# Patient Record
Sex: MALE | Race: WHITE | NOT HISPANIC OR LATINO | Employment: FULL TIME | URBAN - NONMETROPOLITAN AREA
[De-identification: names, ages, dates, MRNs, and addresses within clinical notes are randomized per-mention and may not be internally consistent; named-entity substitution may affect disease eponyms.]

---
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
## 2022-02-02 ENCOUNTER — OFFICE VISIT (OUTPATIENT)
Dept: FAMILY MEDICINE CLINIC | Facility: CLINIC | Age: 22
End: 2022-02-02

## 2022-02-02 VITALS
DIASTOLIC BLOOD PRESSURE: 78 MMHG | OXYGEN SATURATION: 98 % | TEMPERATURE: 97.4 F | HEART RATE: 85 BPM | BODY MASS INDEX: 39.17 KG/M2 | WEIGHT: 315 LBS | HEIGHT: 75 IN | SYSTOLIC BLOOD PRESSURE: 132 MMHG

## 2022-02-02 DIAGNOSIS — Z86.16 HISTORY OF COVID-19: ICD-10-CM

## 2022-02-02 DIAGNOSIS — D68.9 BLOOD CLOTTING DISORDER: Chronic | ICD-10-CM

## 2022-02-02 DIAGNOSIS — I10 HYPERTENSION, UNSPECIFIED TYPE: Primary | Chronic | ICD-10-CM

## 2022-02-02 DIAGNOSIS — E66.01 CLASS 3 SEVERE OBESITY DUE TO EXCESS CALORIES WITH BODY MASS INDEX (BMI) OF 50.0 TO 59.9 IN ADULT, UNSPECIFIED WHETHER SERIOUS COMORBIDITY PRESENT: ICD-10-CM

## 2022-02-02 LAB
FLUAV RNA RESP QL NAA+PROBE: NOT DETECTED
FLUBV RNA RESP QL NAA+PROBE: NOT DETECTED
SARS-COV-2 RNA RESP QL NAA+PROBE: NOT DETECTED

## 2022-02-02 PROCEDURE — 99214 OFFICE O/P EST MOD 30 MIN: CPT | Performed by: NURSE PRACTITIONER

## 2022-02-02 PROCEDURE — 87636 SARSCOV2 & INF A&B AMP PRB: CPT | Performed by: NURSE PRACTITIONER

## 2022-02-02 RX ORDER — CARVEDILOL 3.12 MG/1
3.12 TABLET ORAL 2 TIMES DAILY WITH MEALS
Qty: 60 TABLET | Refills: 5 | Status: SHIPPED | OUTPATIENT
Start: 2022-02-02 | End: 2022-04-03

## 2022-02-02 NOTE — PROGRESS NOTES
Chief Complaint  Establish Care    Subjective          Juvenal Anderson is a 21 y.o. male who presents today to [facility] for establishing care.    HPI:   Patient presents today to [facility].  He states that he is from [place] next year now is a couple months ago.  Patient states he has a history of hypertension and tachycardia which is why he is on Coreg.  He states he also has history of blood clots.  He has had DVT to the left lower extremity twice and has history of a pulmonary embolism.  He denies any current chest pain or swelling.  He states blood clots run in his family as his mother suffers from a clotting disorder as well as her grandmother.  He has no signs or symptoms of bleeding or bruising.  He states he tolerates Eliquis well.    Patient would like to have a Covid test today.  He states his employer is requiring it before he returns to work tomorrow.  Currently asymptomatic, states he had Covid about 3 months ago.        Objective     Problem List:  Patient Active Problem List   Diagnosis   • Attention deficit hyperactivity disorder (ADHD), combined type   • MDD (major depressive disorder)   • Pulmonary emboli (HCC)   • Morbid obesity (HCC)   • Deep vein thrombosis (DVT) of popliteal vein of left lower extremity (HCC)   • Medically noncompliant   • Tobacco use       Allergy:   No Known Allergies     Discontinued Medications:  Medications Discontinued During This Encounter   Medication Reason   • apixaban (ELIQUIS) 5 MG tablet tablet Duplicate order   • carvedilol (COREG) 3.125 MG tablet Reorder   • apixaban (ELIQUIS) 5 MG tablet tablet Reorder       Current Medications:   Current Outpatient Medications   Medication Sig Dispense Refill   • apixaban (ELIQUIS) 5 MG tablet tablet Take 1 tablet by mouth Every 12 (Twelve) Hours. 60 tablet 5   • carvedilol (COREG) 3.125 MG tablet Take 1 tablet by mouth 2 (Two) Times a Day With Meals for 60 days. 60 tablet 5     No current  "facility-administered medications for this visit.       Past Medical History:  Past Medical History:   Diagnosis Date   • ADHD    • Anxiety    • MDD (major depressive disorder)    • Medically noncompliant 6/30/2021   • Morbid obesity (HCC) 10/22/2020   • Oppositional defiant disorder    • Tobacco use 6/30/2021       Past Surgical History:  History reviewed. No pertinent surgical history.    Review of Systems:  Review of Systems   Constitutional: Negative.    HENT: Negative.    Eyes: Negative.    Respiratory: Negative.    Cardiovascular: Negative.    Gastrointestinal: Negative.    Genitourinary: Negative.    Musculoskeletal: Negative.    Skin: Negative.    Neurological: Negative.    Psychiatric/Behavioral: Negative.    All other systems reviewed and are negative.      Physical Exam:  Physical Exam  Vitals and nursing note reviewed.   Constitutional:       General: He is not in acute distress.     Appearance: Normal appearance. He is obese. He is not ill-appearing.      Interventions: Face mask in place.   HENT:      Head: Normocephalic and atraumatic.      Nose: Nose normal.   Cardiovascular:      Rate and Rhythm: Normal rate and regular rhythm.   Pulmonary:      Effort: Pulmonary effort is normal. No respiratory distress.      Breath sounds: Normal breath sounds.   Abdominal:      Palpations: Abdomen is soft.   Musculoskeletal:         General: Normal range of motion.      Cervical back: Normal range of motion and neck supple.   Skin:     General: Skin is warm and dry.   Neurological:      Mental Status: He is alert and oriented to person, place, and time.   Psychiatric:         Mood and Affect: Mood normal.         Behavior: Behavior normal.         Vital Signs:   /78 (BP Location: Right arm, Patient Position: Sitting)   Pulse 85   Temp 97.4 °F (36.3 °C)   Ht 190.5 cm (75\")   Wt (!) 183 kg (403 lb)   SpO2 98%   BMI 50.37 kg/m²      Lab Results:   No visits with results within 6 Month(s) from this visit. "   Latest known visit with results is:   Admission on 06/30/2021, Discharged on 07/01/2021   Component Date Value Ref Range Status   • Glucose 06/30/2021 106* 65 - 99 mg/dL Final   • BUN 06/30/2021 12  6 - 20 mg/dL Final   • Creatinine 06/30/2021 0.78  0.76 - 1.27 mg/dL Final   • Sodium 06/30/2021 136  136 - 145 mmol/L Final   • Potassium 06/30/2021 4.0  3.5 - 5.2 mmol/L Final    Slight hemolysis detected by analyzer. Results may be affected.   • Chloride 06/30/2021 101  98 - 107 mmol/L Final   • CO2 06/30/2021 28.4  22.0 - 29.0 mmol/L Final   • Calcium 06/30/2021 9.3  8.6 - 10.5 mg/dL Final   • Total Protein 06/30/2021 7.0  6.0 - 8.5 g/dL Final   • Albumin 06/30/2021 3.66  3.50 - 5.20 g/dL Final   • ALT (SGPT) 06/30/2021 27  1 - 41 U/L Final   • AST (SGOT) 06/30/2021 21  1 - 40 U/L Final   • Alkaline Phosphatase 06/30/2021 84  56 - 127 U/L Final   • Total Bilirubin 06/30/2021 0.2  0.0 - 1.2 mg/dL Final   • eGFR Non African Amer 06/30/2021 130  >60 mL/min/1.73 Final   • Globulin 06/30/2021 3.3  gm/dL Final   • A/G Ratio 06/30/2021 1.1  g/dL Final   • BUN/Creatinine Ratio 06/30/2021 15.4  7.0 - 25.0 Final   • Anion Gap 06/30/2021 6.6  5.0 - 15.0 mmol/L Final   • COVID19 06/30/2021 Not Detected  Not Detected - Ref. Range Final   • Influenza A PCR 06/30/2021 Not Detected  Not Detected Final   • Influenza B PCR 06/30/2021 Not Detected  Not Detected Final   • Protime 06/30/2021 12.9  12.8 - 14.5 Seconds Final   • INR 06/30/2021 0.94  0.90 - 1.10 Final   • PTT 06/30/2021 29.9  25.5 - 35.4 seconds Final   • WBC 06/30/2021 11.56* 3.40 - 10.80 10*3/mm3 Final   • RBC 06/30/2021 5.27  4.14 - 5.80 10*6/mm3 Final   • Hemoglobin 06/30/2021 14.2  13.0 - 17.7 g/dL Final   • Hematocrit 06/30/2021 44.0  37.5 - 51.0 % Final   • MCV 06/30/2021 83.5  79.0 - 97.0 fL Final   • MCH 06/30/2021 26.9  26.6 - 33.0 pg Final   • MCHC 06/30/2021 32.3  31.5 - 35.7 g/dL Final   • RDW 06/30/2021 13.9  12.3 - 15.4 % Final   • RDW-SD 06/30/2021 42.6   37.0 - 54.0 fl Final   • MPV 06/30/2021 9.8  6.0 - 12.0 fL Final   • Platelets 06/30/2021 287  140 - 450 10*3/mm3 Final   • Neutrophil % 06/30/2021 58.1  42.7 - 76.0 % Final   • Lymphocyte % 06/30/2021 28.9  19.6 - 45.3 % Final   • Monocyte % 06/30/2021 8.6  5.0 - 12.0 % Final   • Eosinophil % 06/30/2021 3.5  0.3 - 6.2 % Final   • Basophil % 06/30/2021 0.6  0.0 - 1.5 % Final   • Immature Grans % 06/30/2021 0.3  0.0 - 0.5 % Final   • Neutrophils, Absolute 06/30/2021 6.72  1.70 - 7.00 10*3/mm3 Final   • Lymphocytes, Absolute 06/30/2021 3.34* 0.70 - 3.10 10*3/mm3 Final   • Monocytes, Absolute 06/30/2021 0.99* 0.10 - 0.90 10*3/mm3 Final   • Eosinophils, Absolute 06/30/2021 0.41* 0.00 - 0.40 10*3/mm3 Final   • Basophils, Absolute 06/30/2021 0.07  0.00 - 0.20 10*3/mm3 Final   • Immature Grans, Absolute 06/30/2021 0.03  0.00 - 0.05 10*3/mm3 Final   • nRBC 06/30/2021 0.0  0.0 - 0.2 /100 WBC Final   • QT Interval 07/01/2021 350  ms Final   • QTC Interval 07/01/2021 437  ms Final   • Hemoglobin A1C 06/30/2021 5.70* 4.80 - 5.60 % Final   • TSH 06/30/2021 1.420  0.270 - 4.200 uIU/mL Final   • Glucose 07/01/2021 109* 65 - 99 mg/dL Final   • BUN 07/01/2021 11  6 - 20 mg/dL Final   • Creatinine 07/01/2021 0.72* 0.76 - 1.27 mg/dL Final   • Sodium 07/01/2021 135* 136 - 145 mmol/L Final   • Potassium 07/01/2021 3.8  3.5 - 5.2 mmol/L Final   • Chloride 07/01/2021 101  98 - 107 mmol/L Final   • CO2 07/01/2021 25.9  22.0 - 29.0 mmol/L Final   • Calcium 07/01/2021 9.1  8.6 - 10.5 mg/dL Final   • Total Protein 07/01/2021 6.9  6.0 - 8.5 g/dL Final   • Albumin 07/01/2021 3.57  3.50 - 5.20 g/dL Final   • ALT (SGPT) 07/01/2021 25  1 - 41 U/L Final   • AST (SGOT) 07/01/2021 19  1 - 40 U/L Final   • Alkaline Phosphatase 07/01/2021 80  56 - 127 U/L Final   • Total Bilirubin 07/01/2021 0.2  0.0 - 1.2 mg/dL Final   • eGFR Non African Amer 07/01/2021 142  >60 mL/min/1.73 Final   • Globulin 07/01/2021 3.3  gm/dL Final   • A/G Ratio 07/01/2021 1.1   g/dL Final   • BUN/Creatinine Ratio 07/01/2021 15.3  7.0 - 25.0 Final   • Anion Gap 07/01/2021 8.1  5.0 - 15.0 mmol/L Final   • Magnesium 07/01/2021 2.0  1.7 - 2.2 mg/dL Final   • Phosphorus 07/01/2021 4.2  2.5 - 4.5 mg/dL Final   • WBC 07/01/2021 11.77* 3.40 - 10.80 10*3/mm3 Final   • RBC 07/01/2021 5.10  4.14 - 5.80 10*6/mm3 Final   • Hemoglobin 07/01/2021 13.8  13.0 - 17.7 g/dL Final   • Hematocrit 07/01/2021 42.8  37.5 - 51.0 % Final   • MCV 07/01/2021 83.9  79.0 - 97.0 fL Final   • MCH 07/01/2021 27.1  26.6 - 33.0 pg Final   • MCHC 07/01/2021 32.2  31.5 - 35.7 g/dL Final   • RDW 07/01/2021 14.0  12.3 - 15.4 % Final   • RDW-SD 07/01/2021 42.7  37.0 - 54.0 fl Final   • MPV 07/01/2021 10.2  6.0 - 12.0 fL Final   • Platelets 07/01/2021 273  140 - 450 10*3/mm3 Final   • Neutrophil % 07/01/2021 54.8  42.7 - 76.0 % Final   • Lymphocyte % 07/01/2021 31.9  19.6 - 45.3 % Final   • Monocyte % 07/01/2021 8.8  5.0 - 12.0 % Final   • Eosinophil % 07/01/2021 3.7  0.3 - 6.2 % Final   • Basophil % 07/01/2021 0.5  0.0 - 1.5 % Final   • Immature Grans % 07/01/2021 0.3  0.0 - 0.5 % Final   • Neutrophils, Absolute 07/01/2021 6.46  1.70 - 7.00 10*3/mm3 Final   • Lymphocytes, Absolute 07/01/2021 3.76* 0.70 - 3.10 10*3/mm3 Final   • Monocytes, Absolute 07/01/2021 1.03* 0.10 - 0.90 10*3/mm3 Final   • Eosinophils, Absolute 07/01/2021 0.43* 0.00 - 0.40 10*3/mm3 Final   • Basophils, Absolute 07/01/2021 0.06  0.00 - 0.20 10*3/mm3 Final   • Immature Grans, Absolute 07/01/2021 0.03  0.00 - 0.05 10*3/mm3 Final   • nRBC 07/01/2021 0.0  0.0 - 0.2 /100 WBC Final       EKG Results:  No orders to display       Imaging Results:  No Images in the past 120 days found..              Assessment and Plan   Diagnoses and all orders for this visit:    1. Hypertension, unspecified type (Primary)  Comments:  continue carvedilol 3.125 mg bid  Orders:  -     carvedilol (COREG) 3.125 MG tablet; Take 1 tablet by mouth 2 (Two) Times a Day With Meals for 60 days.   Dispense: 60 tablet; Refill: 5  -     CBC w AUTO Differential; Future  -     Comprehensive metabolic panel; Future  -     Lipid panel; Future  -     Thyroid Panel With TSH; Future  -     Hemoglobin A1c; Future    2. Blood clotting disorder (HCC)  Comments:  continue eliquis 5 mg bid  patient advised to monitor for s/s of bleeding  Orders:  -     apixaban (ELIQUIS) 5 MG tablet tablet; Take 1 tablet by mouth Every 12 (Twelve) Hours.  Dispense: 60 tablet; Refill: 5    3. History of COVID-19  Comments:  will complete a covid test today for patients employer so he can return to work, asymptomatic  Orders:  -     COVID-19 and FLU A/B PCR - Swab, Nasopharynx; Future  -     COVID-19 and FLU A/B PCR - Swab, Nasopharynx    4. Class 3 severe obesity due to excess calories with body mass index (BMI) of 50.0 to 59.9 in adult, unspecified whether serious comorbidity present (HCC)  Comments:  current weight is 403# BMI is 50.37  routine labs ordered: CBC, CMP, lipid, TSH, A1c  patient plans to return to clinic for lab draw next week        Meds ordered during this visit:  New Medications Ordered This Visit   Medications   • carvedilol (COREG) 3.125 MG tablet     Sig: Take 1 tablet by mouth 2 (Two) Times a Day With Meals for 60 days.     Dispense:  60 tablet     Refill:  5   • apixaban (ELIQUIS) 5 MG tablet tablet     Sig: Take 1 tablet by mouth Every 12 (Twelve) Hours.     Dispense:  60 tablet     Refill:  5       Patient Instructions:  Patient instructions given for the following visit diagnosis:    ICD-10-CM ICD-9-CM   1. Hypertension, unspecified type  I10 401.9   2. Blood clotting disorder (HCC)  D68.9 286.9   3. History of COVID-19  Z86.16 V12.09   4. Class 3 severe obesity due to excess calories with body mass index (BMI) of 50.0 to 59.9 in adult, unspecified whether serious comorbidity present (HCC)  E66.01 278.01    Z68.43 V85.43       Follow Up   Return in about 6 months (around 8/2/2022).        This document has been  electronically signed by HINA Briceño  February 2, 2022 16:20 EST    Patient was given instructions and counseling regarding his condition or for health maintenance advice. Please see specific information pulled into the AVS if appropriate.     Part of this note may be an electronic transcription/translation of spoken language to printed text using the Dragon Dictation System.

## 2022-02-03 ENCOUNTER — TELEPHONE (OUTPATIENT)
Dept: PEDIATRICS | Facility: OTHER | Age: 22
End: 2022-02-03

## 2022-02-03 NOTE — TELEPHONE ENCOUNTER
Caller: Malcolm Anderson    Relationship: Self    Best call back number:[phone]    Caller requesting test results: [facility]     What test was performed: COVID TEST    When was the test performed: [id number]    Where was the test performed: OFFICE     Additional notes: MALCOLM IS CALLING FOR THE RESULTS

## 2022-02-07 ENCOUNTER — LAB (OUTPATIENT)
Dept: FAMILY MEDICINE CLINIC | Facility: CLINIC | Age: 22
End: 2022-02-07

## 2022-02-07 DIAGNOSIS — I10 HYPERTENSION, UNSPECIFIED TYPE: ICD-10-CM

## 2022-02-07 DIAGNOSIS — F90.2 ATTENTION DEFICIT HYPERACTIVITY DISORDER (ADHD), COMBINED TYPE: Chronic | ICD-10-CM

## 2022-02-07 PROCEDURE — 36415 COLL VENOUS BLD VENIPUNCTURE: CPT | Performed by: NURSE PRACTITIONER

## 2022-02-07 PROCEDURE — 80053 COMPREHEN METABOLIC PANEL: CPT | Performed by: NURSE PRACTITIONER

## 2022-02-07 PROCEDURE — 85025 COMPLETE CBC W/AUTO DIFF WBC: CPT | Performed by: NURSE PRACTITIONER

## 2022-02-07 PROCEDURE — 84443 ASSAY THYROID STIM HORMONE: CPT | Performed by: NURSE PRACTITIONER

## 2022-02-07 PROCEDURE — 84479 ASSAY OF THYROID (T3 OR T4): CPT | Performed by: NURSE PRACTITIONER

## 2022-02-07 PROCEDURE — 83036 HEMOGLOBIN GLYCOSYLATED A1C: CPT | Performed by: NURSE PRACTITIONER

## 2022-02-07 PROCEDURE — 84436 ASSAY OF TOTAL THYROXINE: CPT | Performed by: NURSE PRACTITIONER

## 2022-02-07 PROCEDURE — 80061 LIPID PANEL: CPT | Performed by: NURSE PRACTITIONER

## 2022-02-08 LAB
ALBUMIN SERPL-MCNC: 4.2 G/DL (ref 3.5–5.2)
ALBUMIN/GLOB SERPL: 1.2 G/DL
ALP SERPL-CCNC: 82 U/L (ref 39–117)
ALT SERPL W P-5'-P-CCNC: 45 U/L (ref 1–41)
ANION GAP SERPL CALCULATED.3IONS-SCNC: 10.9 MMOL/L (ref 5–15)
AST SERPL-CCNC: 27 U/L (ref 1–40)
BASOPHILS # BLD AUTO: 0.06 10*3/MM3 (ref 0–0.2)
BASOPHILS NFR BLD AUTO: 0.7 % (ref 0–1.5)
BILIRUB SERPL-MCNC: 0.3 MG/DL (ref 0–1.2)
BUN SERPL-MCNC: 10 MG/DL (ref 6–20)
BUN/CREAT SERPL: 24.4 (ref 7–25)
CALCIUM SPEC-SCNC: 9.7 MG/DL (ref 8.6–10.5)
CHLORIDE SERPL-SCNC: 103 MMOL/L (ref 98–107)
CHOLEST SERPL-MCNC: 116 MG/DL (ref 0–200)
CO2 SERPL-SCNC: 26.1 MMOL/L (ref 22–29)
CREAT SERPL-MCNC: 0.41 MG/DL (ref 0.76–1.27)
DEPRECATED RDW RBC AUTO: 44.5 FL (ref 37–54)
EOSINOPHIL # BLD AUTO: 0.17 10*3/MM3 (ref 0–0.4)
EOSINOPHIL NFR BLD AUTO: 1.9 % (ref 0.3–6.2)
ERYTHROCYTE [DISTWIDTH] IN BLOOD BY AUTOMATED COUNT: 14.4 % (ref 12.3–15.4)
GFR SERPL CREATININE-BSD FRML MDRD: >150 ML/MIN/1.73
GLOBULIN UR ELPH-MCNC: 3.4 GM/DL
GLUCOSE SERPL-MCNC: 78 MG/DL (ref 65–99)
HBA1C MFR BLD: 5.77 % (ref 4.8–5.6)
HCT VFR BLD AUTO: 47.7 % (ref 37.5–51)
HDLC SERPL-MCNC: 34 MG/DL (ref 40–60)
HGB BLD-MCNC: 15.6 G/DL (ref 13–17.7)
IMM GRANULOCYTES # BLD AUTO: 0.03 10*3/MM3 (ref 0–0.05)
IMM GRANULOCYTES NFR BLD AUTO: 0.3 % (ref 0–0.5)
LDLC SERPL CALC-MCNC: 68 MG/DL (ref 0–100)
LDLC/HDLC SERPL: 2.03 {RATIO}
LYMPHOCYTES # BLD AUTO: 3.59 10*3/MM3 (ref 0.7–3.1)
LYMPHOCYTES NFR BLD AUTO: 39.1 % (ref 19.6–45.3)
MCH RBC QN AUTO: 27.8 PG (ref 26.6–33)
MCHC RBC AUTO-ENTMCNC: 32.7 G/DL (ref 31.5–35.7)
MCV RBC AUTO: 85 FL (ref 79–97)
MONOCYTES # BLD AUTO: 0.83 10*3/MM3 (ref 0.1–0.9)
MONOCYTES NFR BLD AUTO: 9 % (ref 5–12)
NEUTROPHILS NFR BLD AUTO: 4.5 10*3/MM3 (ref 1.7–7)
NEUTROPHILS NFR BLD AUTO: 49 % (ref 42.7–76)
NRBC BLD AUTO-RTO: 0 /100 WBC (ref 0–0.2)
PLATELET # BLD AUTO: 291 10*3/MM3 (ref 140–450)
PMV BLD AUTO: 10.9 FL (ref 6–12)
POTASSIUM SERPL-SCNC: 4.3 MMOL/L (ref 3.5–5.2)
PROT SERPL-MCNC: 7.6 G/DL (ref 6–8.5)
RBC # BLD AUTO: 5.61 10*6/MM3 (ref 4.14–5.8)
SODIUM SERPL-SCNC: 140 MMOL/L (ref 136–145)
T-UPTAKE NFR SERPL: 1 TBI (ref 0.8–1.3)
T4 SERPL-MCNC: 6.59 MCG/DL (ref 4.5–11.7)
TRIGL SERPL-MCNC: 65 MG/DL (ref 0–150)
TSH SERPL DL<=0.05 MIU/L-ACNC: 1.32 UIU/ML (ref 0.27–4.2)
VLDLC SERPL-MCNC: 14 MG/DL (ref 5–40)
WBC NRBC COR # BLD: 9.18 10*3/MM3 (ref 3.4–10.8)

## 2022-02-09 ENCOUNTER — PATIENT ROUNDING (BHMG ONLY) (OUTPATIENT)
Dept: FAMILY MEDICINE CLINIC | Facility: CLINIC | Age: 22
End: 2022-02-09

## 2022-02-09 NOTE — PROGRESS NOTES
February 9, 2022    Hello, may I speak with Juvenal Anderson?    My name is Steffi Carbajal      I am  with [facility]  [address] [place] [id number] [phone].    Before we get started may I verify your date of birth? 2000    I am calling to officially welcome you to our practice and ask about your recent visit. Is this a good time to talk? yes    Tell me about your visit with us. What things went well?  It was good. I got in there fast and out fast. Everybody was nice.        We're always looking for ways to make our patients' experiences even better. Do you have recommendations on ways we may improve?  no    Overall were you satisfied with your first visit to our practice? yes       I appreciate you taking the time to speak with me today. Is there anything else I can do for you? no      Thank you, and have a great day.

## 2023-02-26 ENCOUNTER — APPOINTMENT (OUTPATIENT)
Dept: GENERAL RADIOLOGY | Facility: HOSPITAL | Age: 23
End: 2023-02-26
Payer: COMMERCIAL

## 2023-02-26 ENCOUNTER — HOSPITAL ENCOUNTER (EMERGENCY)
Facility: HOSPITAL | Age: 23
Discharge: HOME | End: 2023-02-26
Attending: EMERGENCY MEDICINE | Admitting: EMERGENCY MEDICINE
Payer: COMMERCIAL

## 2023-02-26 VITALS
OXYGEN SATURATION: 98 % | HEIGHT: 75 IN | TEMPERATURE: 97.5 F | HEART RATE: 75 BPM | WEIGHT: 315 LBS | BODY MASS INDEX: 39.17 KG/M2 | DIASTOLIC BLOOD PRESSURE: 53 MMHG | SYSTOLIC BLOOD PRESSURE: 122 MMHG | RESPIRATION RATE: 18 BRPM

## 2023-02-26 DIAGNOSIS — R07.89 CHEST WALL PAIN: Primary | ICD-10-CM

## 2023-02-26 LAB
ALBUMIN SERPL-MCNC: 4.3 G/DL (ref 3.5–5.2)
ALBUMIN/GLOB SERPL: 1.5 G/DL
ALP SERPL-CCNC: 80 U/L (ref 39–117)
ALT SERPL W P-5'-P-CCNC: 46 U/L (ref 1–41)
ANION GAP SERPL CALCULATED.3IONS-SCNC: 12.2 MMOL/L (ref 5–15)
AST SERPL-CCNC: 28 U/L (ref 1–40)
BASOPHILS # BLD AUTO: 0.04 10*3/MM3 (ref 0–0.2)
BASOPHILS NFR BLD AUTO: 0.4 % (ref 0–1.5)
BILIRUB SERPL-MCNC: 0.2 MG/DL (ref 0–1.2)
BUN SERPL-MCNC: 11 MG/DL (ref 6–20)
BUN/CREAT SERPL: 16.9 (ref 7–25)
CALCIUM SPEC-SCNC: 9.9 MG/DL (ref 8.6–10.5)
CHLORIDE SERPL-SCNC: 103 MMOL/L (ref 98–107)
CO2 SERPL-SCNC: 25.8 MMOL/L (ref 22–29)
CREAT SERPL-MCNC: 0.65 MG/DL (ref 0.76–1.27)
CRP SERPL-MCNC: <0.3 MG/DL (ref 0–0.5)
D DIMER PPP FEU-MCNC: 0.28 MCGFEU/ML (ref 0–0.5)
DEPRECATED RDW RBC AUTO: 42.8 FL (ref 37–54)
EGFRCR SERPLBLD CKD-EPI 2021: 138.3 ML/MIN/1.73
EOSINOPHIL # BLD AUTO: 0.2 10*3/MM3 (ref 0–0.4)
EOSINOPHIL NFR BLD AUTO: 1.8 % (ref 0.3–6.2)
ERYTHROCYTE [DISTWIDTH] IN BLOOD BY AUTOMATED COUNT: 13.6 % (ref 12.3–15.4)
GLOBULIN UR ELPH-MCNC: 2.9 GM/DL
GLUCOSE SERPL-MCNC: 97 MG/DL (ref 65–99)
HCT VFR BLD AUTO: 47 % (ref 37.5–51)
HGB BLD-MCNC: 15.6 G/DL (ref 13–17.7)
HOLD SPECIMEN: NORMAL
HOLD SPECIMEN: NORMAL
IMM GRANULOCYTES # BLD AUTO: 0.02 10*3/MM3 (ref 0–0.05)
IMM GRANULOCYTES NFR BLD AUTO: 0.2 % (ref 0–0.5)
LYMPHOCYTES # BLD AUTO: 4.44 10*3/MM3 (ref 0.7–3.1)
LYMPHOCYTES NFR BLD AUTO: 40.9 % (ref 19.6–45.3)
MCH RBC QN AUTO: 28.7 PG (ref 26.6–33)
MCHC RBC AUTO-ENTMCNC: 33.2 G/DL (ref 31.5–35.7)
MCV RBC AUTO: 86.6 FL (ref 79–97)
MONOCYTES # BLD AUTO: 0.85 10*3/MM3 (ref 0.1–0.9)
MONOCYTES NFR BLD AUTO: 7.8 % (ref 5–12)
NEUTROPHILS NFR BLD AUTO: 48.9 % (ref 42.7–76)
NEUTROPHILS NFR BLD AUTO: 5.31 10*3/MM3 (ref 1.7–7)
NRBC BLD AUTO-RTO: 0 /100 WBC (ref 0–0.2)
PLATELET # BLD AUTO: 274 10*3/MM3 (ref 140–450)
PMV BLD AUTO: 10.4 FL (ref 6–12)
POTASSIUM SERPL-SCNC: 4.2 MMOL/L (ref 3.5–5.2)
PROT SERPL-MCNC: 7.2 G/DL (ref 6–8.5)
RBC # BLD AUTO: 5.43 10*6/MM3 (ref 4.14–5.8)
SODIUM SERPL-SCNC: 141 MMOL/L (ref 136–145)
TROPONIN T SERPL HS-MCNC: <6 NG/L
WBC NRBC COR # BLD: 10.86 10*3/MM3 (ref 3.4–10.8)
WHOLE BLOOD HOLD COAG: NORMAL
WHOLE BLOOD HOLD SPECIMEN: NORMAL

## 2023-02-26 PROCEDURE — 86140 C-REACTIVE PROTEIN: CPT | Performed by: PHYSICIAN ASSISTANT

## 2023-02-26 PROCEDURE — 99283 EMERGENCY DEPT VISIT LOW MDM: CPT

## 2023-02-26 PROCEDURE — 93005 ELECTROCARDIOGRAM TRACING: CPT | Performed by: EMERGENCY MEDICINE

## 2023-02-26 PROCEDURE — 85025 COMPLETE CBC W/AUTO DIFF WBC: CPT | Performed by: PHYSICIAN ASSISTANT

## 2023-02-26 PROCEDURE — 84484 ASSAY OF TROPONIN QUANT: CPT | Performed by: PHYSICIAN ASSISTANT

## 2023-02-26 PROCEDURE — 71045 X-RAY EXAM CHEST 1 VIEW: CPT

## 2023-02-26 PROCEDURE — 93010 ELECTROCARDIOGRAM REPORT: CPT | Performed by: SPECIALIST

## 2023-02-26 PROCEDURE — 85379 FIBRIN DEGRADATION QUANT: CPT | Performed by: PHYSICIAN ASSISTANT

## 2023-02-26 PROCEDURE — 80053 COMPREHEN METABOLIC PANEL: CPT | Performed by: PHYSICIAN ASSISTANT

## 2023-02-26 RX ORDER — SODIUM CHLORIDE 0.9 % (FLUSH) 0.9 %
10 SYRINGE (ML) INJECTION AS NEEDED
Status: DISCONTINUED | OUTPATIENT
Start: 2023-02-26 | End: 2023-02-27 | Stop reason: HOSPADM

## 2023-02-27 LAB
QT INTERVAL: 346 MS
QTC INTERVAL: 375 MS

## 2024-04-13 ENCOUNTER — APPOINTMENT (OUTPATIENT)
Dept: GENERAL RADIOLOGY | Facility: HOSPITAL | Age: 24
End: 2024-04-13
Payer: COMMERCIAL

## 2024-04-13 LAB
ALBUMIN SERPL-MCNC: 4.2 G/DL (ref 3.5–5.2)
ALBUMIN/GLOB SERPL: 1.4 G/DL
ALP SERPL-CCNC: 72 U/L (ref 39–117)
ALT SERPL W P-5'-P-CCNC: 34 U/L (ref 1–41)
ANION GAP SERPL CALCULATED.3IONS-SCNC: 11.2 MMOL/L (ref 5–15)
AST SERPL-CCNC: 26 U/L (ref 1–40)
BASOPHILS # BLD AUTO: 0.04 10*3/MM3 (ref 0–0.2)
BASOPHILS NFR BLD AUTO: 0.5 % (ref 0–1.5)
BILIRUB SERPL-MCNC: 0.3 MG/DL (ref 0–1.2)
BUN SERPL-MCNC: 7 MG/DL (ref 6–20)
BUN/CREAT SERPL: 10 (ref 7–25)
CALCIUM SPEC-SCNC: 9.4 MG/DL (ref 8.6–10.5)
CHLORIDE SERPL-SCNC: 102 MMOL/L (ref 98–107)
CO2 SERPL-SCNC: 24.8 MMOL/L (ref 22–29)
CREAT SERPL-MCNC: 0.7 MG/DL (ref 0.76–1.27)
DEPRECATED RDW RBC AUTO: 41.5 FL (ref 37–54)
EGFRCR SERPLBLD CKD-EPI 2021: 134.4 ML/MIN/1.73
EOSINOPHIL # BLD AUTO: 0.08 10*3/MM3 (ref 0–0.4)
EOSINOPHIL NFR BLD AUTO: 1 % (ref 0.3–6.2)
ERYTHROCYTE [DISTWIDTH] IN BLOOD BY AUTOMATED COUNT: 13.4 % (ref 12.3–15.4)
GLOBULIN UR ELPH-MCNC: 3 GM/DL
GLUCOSE SERPL-MCNC: 85 MG/DL (ref 65–99)
HCT VFR BLD AUTO: 45.6 % (ref 37.5–51)
HGB BLD-MCNC: 15.2 G/DL (ref 13–17.7)
IMM GRANULOCYTES # BLD AUTO: 0.02 10*3/MM3 (ref 0–0.05)
IMM GRANULOCYTES NFR BLD AUTO: 0.3 % (ref 0–0.5)
LYMPHOCYTES # BLD AUTO: 2.28 10*3/MM3 (ref 0.7–3.1)
LYMPHOCYTES NFR BLD AUTO: 29.8 % (ref 19.6–45.3)
MCH RBC QN AUTO: 28.4 PG (ref 26.6–33)
MCHC RBC AUTO-ENTMCNC: 33.3 G/DL (ref 31.5–35.7)
MCV RBC AUTO: 85.1 FL (ref 79–97)
MONOCYTES # BLD AUTO: 0.82 10*3/MM3 (ref 0.1–0.9)
MONOCYTES NFR BLD AUTO: 10.7 % (ref 5–12)
NEUTROPHILS NFR BLD AUTO: 4.4 10*3/MM3 (ref 1.7–7)
NEUTROPHILS NFR BLD AUTO: 57.7 % (ref 42.7–76)
NRBC BLD AUTO-RTO: 0 /100 WBC (ref 0–0.2)
PLATELET # BLD AUTO: 264 10*3/MM3 (ref 140–450)
PMV BLD AUTO: 10 FL (ref 6–12)
POTASSIUM SERPL-SCNC: 4.3 MMOL/L (ref 3.5–5.2)
PROT SERPL-MCNC: 7.2 G/DL (ref 6–8.5)
RBC # BLD AUTO: 5.36 10*6/MM3 (ref 4.14–5.8)
SODIUM SERPL-SCNC: 138 MMOL/L (ref 136–145)
TROPONIN T SERPL HS-MCNC: <6 NG/L
WBC NRBC COR # BLD AUTO: 7.64 10*3/MM3 (ref 3.4–10.8)

## 2024-04-13 PROCEDURE — 71046 X-RAY EXAM CHEST 2 VIEWS: CPT

## 2024-04-13 PROCEDURE — 36415 COLL VENOUS BLD VENIPUNCTURE: CPT | Performed by: EMERGENCY MEDICINE

## 2024-04-13 PROCEDURE — 85379 FIBRIN DEGRADATION QUANT: CPT | Performed by: PHYSICIAN ASSISTANT

## 2024-04-13 PROCEDURE — 84484 ASSAY OF TROPONIN QUANT: CPT | Performed by: EMERGENCY MEDICINE

## 2024-04-13 PROCEDURE — 85025 COMPLETE CBC W/AUTO DIFF WBC: CPT | Performed by: EMERGENCY MEDICINE

## 2024-04-13 PROCEDURE — 71046 X-RAY EXAM CHEST 2 VIEWS: CPT | Performed by: RADIOLOGY

## 2024-04-13 PROCEDURE — 99284 EMERGENCY DEPT VISIT MOD MDM: CPT

## 2024-04-13 PROCEDURE — 80053 COMPREHEN METABOLIC PANEL: CPT | Performed by: EMERGENCY MEDICINE

## 2024-04-13 PROCEDURE — 93005 ELECTROCARDIOGRAM TRACING: CPT | Performed by: EMERGENCY MEDICINE

## 2024-04-13 RX ORDER — SODIUM CHLORIDE 0.9 % (FLUSH) 0.9 %
10 SYRINGE (ML) INJECTION AS NEEDED
Status: DISCONTINUED | OUTPATIENT
Start: 2024-04-13 | End: 2024-04-14 | Stop reason: HOSPADM

## 2024-04-14 ENCOUNTER — APPOINTMENT (OUTPATIENT)
Dept: ULTRASOUND IMAGING | Facility: HOSPITAL | Age: 24
End: 2024-04-14
Payer: COMMERCIAL

## 2024-04-14 ENCOUNTER — HOSPITAL ENCOUNTER (EMERGENCY)
Facility: HOSPITAL | Age: 24
Discharge: HOME | End: 2024-04-14
Attending: EMERGENCY MEDICINE | Admitting: EMERGENCY MEDICINE
Payer: COMMERCIAL

## 2024-04-14 VITALS
DIASTOLIC BLOOD PRESSURE: 71 MMHG | TEMPERATURE: 98 F | OXYGEN SATURATION: 97 % | WEIGHT: 315 LBS | HEART RATE: 74 BPM | RESPIRATION RATE: 18 BRPM | SYSTOLIC BLOOD PRESSURE: 136 MMHG | BODY MASS INDEX: 39.17 KG/M2 | HEIGHT: 75 IN

## 2024-04-14 DIAGNOSIS — R07.89 CHEST WALL PAIN: Primary | ICD-10-CM

## 2024-04-14 LAB
D DIMER PPP FEU-MCNC: <0.27 MCGFEU/ML (ref 0–0.5)
GEN 5 2HR TROPONIN T REFLEX: <6 NG/L
HOLD SPECIMEN: NORMAL
HOLD SPECIMEN: NORMAL
QT INTERVAL: 370 MS
QTC INTERVAL: 416 MS
TROPONIN T DELTA: NORMAL
WHOLE BLOOD HOLD COAG: NORMAL
WHOLE BLOOD HOLD SPECIMEN: NORMAL

## 2024-04-14 PROCEDURE — 93971 EXTREMITY STUDY: CPT

## 2024-04-14 PROCEDURE — 93971 EXTREMITY STUDY: CPT | Performed by: RADIOLOGY

## 2024-04-14 PROCEDURE — 84484 ASSAY OF TROPONIN QUANT: CPT | Performed by: EMERGENCY MEDICINE

## 2024-04-14 RX ADMIN — APIXABAN 10 MG: 5 TABLET, FILM COATED ORAL at 04:30

## 2024-04-14 NOTE — Clinical Note
[facility] EMERGENCY DEPARTMENT  [address] [facility] [address]  Phone: [phone]    Juvenal Anderson was seen and treated in our emergency department on 4/13/2024.  He may return to work on 04/14/2024.  Please excuse for able to 13th.  Patient can return to the work on the 14th at this point in time with no restrictions.       Thank you for choosing [facility].    Andrea Lopez II, PA

## 2024-04-14 NOTE — ED PROVIDER NOTES
Subjective   History of Present Illness  23-year-old male presents ER with chief complaint of chest pain.  Patient does have known history of pulmonary emboli as well as DVTs.  Patient has not been able to afford his Eliquis secondary to losing his insurance.  Patient is now employed and has insurance.  Patient has been off his Eliquis for several days weeks.  Patient stated he did have an episode of chest pain and he wanted come to the emergency department to make sure that he was not having any further issues.        Review of Systems   Constitutional: Negative.  Negative for fever.   HENT: Negative.     Respiratory: Negative.     Cardiovascular:  Positive for chest pain.   Gastrointestinal: Negative.  Negative for abdominal pain.   Endocrine: Negative.    Genitourinary: Negative.  Negative for dysuria.   Skin: Negative.    Neurological: Negative.    Psychiatric/Behavioral: Negative.     All other systems reviewed and are negative.      Past Medical History:   Diagnosis Date    ADHD     Anxiety     MDD (major depressive disorder)     Medically noncompliant 6/30/2021    Morbid obesity 10/22/2020    Oppositional defiant disorder     Tobacco use 6/30/2021       No Known Allergies    No past surgical history on file.    Family History   Problem Relation Age of Onset    Pulmonary embolism Mother     Heart disease Maternal Grandmother     Stroke Maternal Grandmother     Heart attack Maternal Grandmother        Social History     Socioeconomic History    Marital status: Single   Tobacco Use    Smoking status: Some Days     Current packs/day: 0.50     Average packs/day: 0.5 packs/day for 5.0 years (2.5 ttl pk-yrs)     Types: Cigarettes    Smokeless tobacco: Current     Types: Snuff    Tobacco comments:     Smokes 0.5 packs of cigarettes a day when he has them    Vaping Use    Vaping status: Every Day    Substances: Nicotine    Devices: Refillable tank   Substance and Sexual Activity    Alcohol use: Yes    Drug use: No      Comment: denies    Sexual activity: Never           Objective   Physical Exam  Vitals and nursing note reviewed.   Constitutional:       General: He is not in acute distress.     Appearance: He is well-developed. He is not diaphoretic.   HENT:      Head: Normocephalic and atraumatic.      Right Ear: External ear normal.      Left Ear: External ear normal.      Nose: Nose normal.   Eyes:      Conjunctiva/sclera: Conjunctivae normal.   Neck:      Vascular: No JVD.      Trachea: No tracheal deviation.   Cardiovascular:      Rate and Rhythm: Normal rate and regular rhythm.      Heart sounds: Normal heart sounds. No murmur heard.  Pulmonary:      Effort: Pulmonary effort is normal. No respiratory distress.      Breath sounds: Normal breath sounds. No wheezing.   Abdominal:      Palpations: Abdomen is soft.      Tenderness: There is no abdominal tenderness.   Musculoskeletal:         General: No deformity. Normal range of motion.      Cervical back: Normal range of motion and neck supple.      Left lower leg: Edema present.   Skin:     General: Skin is warm and dry.      Coloration: Skin is not pale.      Findings: No erythema or rash.   Neurological:      Mental Status: He is alert and oriented to person, place, and time.      Cranial Nerves: No cranial nerve deficit.   Psychiatric:         Behavior: Behavior normal.         Thought Content: Thought content normal.         Procedures           ED Course  ED Course as of 04/14/24 0430   Sat Apr 13, 2024 2136 ECG 21:22 NSR, rate 76. RSR consistent with R ventricular delay. QT/qTc 370/416 [DONALD]      ED Course User Index  [DONALD] Navjot Wright MD                HEART Score: 1                              Medical Decision Making  23-year-old with chest pain    Problems Addressed:  Chest wall pain: acute illness or injury     Details: Patient's cardiac workup is negative.  D-dimer is normal low concern for pulmonary emboli.  Venous Doppler ordered for mild swelling of the  left lower extremity given the patient's known history of pulmonary emboli as well as DVTs.  Eliquis restarted.  Outpatient follow-up highly recommended.    Amount and/or Complexity of Data Reviewed  Labs: ordered.  Radiology: ordered. Decision-making details documented in ED Course.  ECG/medicine tests: ordered. Decision-making details documented in ED Course.    Risk  Prescription drug management.        Final diagnoses:   Chest wall pain       ED Disposition  ED Disposition       ED Disposition   Discharge    Condition   Stable    Comment   --               PATIENT CONNECTION - [facility]  See Provider List  [place] [address]  [phone]  Schedule an appointment as soon as possible for a visit            Medication List        New Prescriptions      Apixaban Starter Pack tablet therapy pack  Take two 5 mg tablets by mouth every 12 hours for 7 days. Followed by one 5 mg tablet every 12 hours. (Dispense starter pack if available)  Replaces: apixaban 5 MG tablet tablet            Stop      apixaban 5 MG tablet tablet  Commonly known as: ELIQUIS  Replaced by: Apixaban Starter Pack tablet therapy pack               Where to Get Your Medications        These medications were sent to [facility] [address] [place] [address] - [phone] [facility] [phone]   [address] [address]      Phone: [phone]   Apixaban Starter Pack tablet therapy pack            Andrea Lopez II, PA  04/14/24 5326

## 2024-04-16 ENCOUNTER — PATIENT ROUNDING (BHMG ONLY) (OUTPATIENT)
Dept: FAMILY MEDICINE CLINIC | Facility: CLINIC | Age: 24
End: 2024-04-16
Payer: COMMERCIAL

## 2024-04-16 ENCOUNTER — OFFICE VISIT (OUTPATIENT)
Dept: FAMILY MEDICINE CLINIC | Facility: CLINIC | Age: 24
End: 2024-04-16
Payer: COMMERCIAL

## 2024-04-16 VITALS
DIASTOLIC BLOOD PRESSURE: 72 MMHG | TEMPERATURE: 97.3 F | OXYGEN SATURATION: 99 % | SYSTOLIC BLOOD PRESSURE: 112 MMHG | HEART RATE: 74 BPM | BODY MASS INDEX: 39.17 KG/M2 | HEIGHT: 75 IN | WEIGHT: 315 LBS

## 2024-04-16 DIAGNOSIS — Z82.49 FAMILY HISTORY OF PULMONARY EMBOLISM: ICD-10-CM

## 2024-04-16 DIAGNOSIS — Z86.718 HISTORY OF DVT OF LOWER EXTREMITY: ICD-10-CM

## 2024-04-16 DIAGNOSIS — Z86.711 HISTORY OF PULMONARY EMBOLISM: Primary | ICD-10-CM

## 2024-04-16 PROBLEM — Z91.199 MEDICALLY NONCOMPLIANT: Chronic | Status: RESOLVED | Noted: 2021-06-30 | Resolved: 2024-04-16

## 2024-04-16 PROBLEM — I82.432 DEEP VEIN THROMBOSIS (DVT) OF POPLITEAL VEIN OF LEFT LOWER EXTREMITY: Status: RESOLVED | Noted: 2021-06-30 | Resolved: 2024-04-16

## 2024-04-16 PROBLEM — I26.99 PULMONARY EMBOLI: Status: RESOLVED | Noted: 2020-10-21 | Resolved: 2024-04-16

## 2024-04-16 NOTE — PROGRESS NOTES
Patient Name: Juvenal Anderson Today's Date: 2024   Patient MRN / CSN: [id number] / [id number] Date of Encounter: 2024   Patient Age / : 23 y.o. / 2000 Encounter Provider: Savanah Amato DO   Referring Physician: No ref. provider found          Juvenal is a 23 y.o. male who is being seen today for Hospital Follow Up Visit (Here for a hospital follow up. Was seen in the er with chest pain, but thinks it was related to energy drinks. Drinking 1-2 a day.)      History of Present Illness    Juvenal presents today to establish care after recently being evaluated in the emergency department for chest pain.  He reports having chest discomfort, sudden in onset in the center of his chest with associated shortness of air and palpitations.  He went to the emergency department concerned of pulmonary embolism, as he has had bilateral pulmonary embolism previously.  While in the emergency department, his D-dimer was normal.  2 troponin levels were also normal.  He thinks the chest pain was likely related to some energy drinks that he was drinking at that time.  He has since tried to avoid these energy drinks and is no longer having any symptoms.    As mentioned above, Juvenal has a history of bilateral pulmonary embolism and left lower extremity DVT which occurred at the age of 20.  He was on Eliquis therapy at that time but has since stopped it due to cost and loss of insurance.  He reports that he did not have any injury, trauma, immobilization, or any known cause at that time to provoke DVT/PE.  He also did not follow-up with hematology or have hypercoagulability workup done due to cost.  He notes a family history of DVT and PE, reporting that his mother has had multiple DVTs and PEs in the past, even while taking anticoagulation.  He reports that her first DVT/PE happened when she was at the age of 18 as well.  Given that history, it was recommended in the emergency department for patient to restart Eliquis  therapy and he was prescribed the Eliquis starter pack.  He was given 2 pills of Eliquis while in the emergency department.  He went to get the starter pack filled at the local pharmacy, but was unable to afford it so he has not started this prescription.    Allergies include:Patient has no known allergies.  Current Outpatient Medications   Medication Sig Dispense Refill    Apixaban Starter Pack tablet therapy pack Take two 5 mg tablets by mouth every 12 hours for 7 days. Followed by one 5 mg tablet every 12 hours. (Dispense starter pack if available) 74 tablet 0     No current facility-administered medications for this visit.     Past Medical History:   Diagnosis Date    ADHD     Anxiety     Deep vein thrombosis (DVT) of popliteal vein of left lower extremity 2021    MDD (major depressive disorder)     Medically noncompliant 2021    Morbid obesity 10/22/2020    Oppositional defiant disorder     Tobacco use 2021     Family History   Problem Relation Age of Onset    Pulmonary embolism Mother     Heart disease Maternal Grandmother     Stroke Maternal Grandmother     Heart attack Maternal Grandmother      History reviewed. No pertinent surgical history.  Social History     Substance and Sexual Activity   Alcohol Use Yes     Social History     Tobacco Use   Smoking Status Former    Average packs/day: 0.5 packs/day for 5.0 years (2.5 ttl pk-yrs)    Types: Cigarettes    Start date: 2023    Quit date: 2017    Years since quittin.2   Smokeless Tobacco Current    Types: Snuff   Tobacco Comments    Smokes 0.5 packs of cigarettes a day when he has them      Social History     Substance and Sexual Activity   Drug Use No    Comment: denies     Review of Systems   Constitutional:  Negative for fever.   Respiratory:  Negative for shortness of breath.    Cardiovascular:  Negative for chest pain.   Gastrointestinal:  Negative for abdominal pain and blood in stool.   Genitourinary:  Negative for hematuria.  "       Depression Assessment Review:  PHQ-9 Total Score: 0  Vital Signs & Measurements Taken This Encounter  /72 (BP Location: Right arm, Patient Position: Sitting, Cuff Size: Large Adult)   Pulse 74   Temp 97.3 °F (36.3 °C) (Temporal)   Ht 190.5 cm (75\")   Wt (!) 162 kg (357 lb 3.2 oz)   SpO2 99%   BMI 44.65 kg/m²    SpO2 Percentage    04/16/24 1216   SpO2: 99%        Physical Exam  Vitals reviewed.   Constitutional:       General: He is not in acute distress.  HENT:      Head: Normocephalic and atraumatic.   Eyes:      General: No scleral icterus.     Extraocular Movements: Extraocular movements intact.      Conjunctiva/sclera: Conjunctivae normal.      Pupils: Pupils are equal, round, and reactive to light.   Cardiovascular:      Rate and Rhythm: Normal rate and regular rhythm.   Pulmonary:      Effort: Pulmonary effort is normal. No respiratory distress.      Breath sounds: Normal breath sounds. No wheezing or rhonchi.   Abdominal:      Palpations: Abdomen is soft.      Tenderness: There is no abdominal tenderness.   Musculoskeletal:      Cervical back: Neck supple. No tenderness.      Comments: No pitting edema of the lower extremities.  Left calf is larger than right without erythema or warmth.  Patient reports that there has not been any recent change in his left calf circumference.  He reports that his left calf has been larger than the right since the DVT at the age of 20.   Lymphadenopathy:      Cervical: No cervical adenopathy.   Skin:     General: Skin is warm and dry.      Coloration: Skin is not jaundiced.   Neurological:      Mental Status: He is alert.   Psychiatric:         Mood and Affect: Mood normal.         Behavior: Behavior normal.         Thought Content: Thought content normal.         Judgment: Judgment normal.              Assessment & Plan  Patient Active Problem List   Diagnosis    Attention deficit hyperactivity disorder (ADHD), combined type    MDD (major depressive " disorder)    Morbid obesity    Tobacco use    History of DVT of lower extremity    History of pulmonary embolism    Family history of pulmonary embolism       ICD-10-CM ICD-9-CM   1. History of pulmonary embolism  Z86.711 V12.55   2. History of DVT of lower extremity  Z86.718 V12.51   3. Family history of pulmonary embolism  Z82.49 V17.49     Orders Placed This Encounter   Procedures    Ambulatory Referral to Hematology     Referral Priority:   Routine     Referral Type:   Consultation     Referral Reason:   Specialty Services Required     Requested Specialty:   Hematology     Number of Visits Requested:   1    Ambulatory Referral to Disease State Management     Referral Priority:   Routine     Referral Type:   Consultation     Number of Visits Requested:   1       Meds Ordered During Visit:  New Medications Ordered This Visit   Medications    Apixaban Starter Pack tablet therapy pack     Sig: Take two 5 mg tablets by mouth every 12 hours for 7 days. Followed by one 5 mg tablet every 12 hours. (Dispense starter pack if available)     Dispense:  74 tablet     Refill:  0     I reviewed emergency department records and discussed those results with patient today.  I recommended he avoid energy drinks, as he is doing.  I also recommended he take Eliquis and explained that I will send this prescription to the hospital pharmacy.  We will request specialty pharmacy services to help with the cost of this medication.  I also encouraged Juvenal to follow-up with hematology and he is agreeable to doing so.  We will schedule this for him.      Return in about 3 months (around 7/16/2024), or if symptoms worsen or fail to improve, for Recheck.          Referring Provider (if known): No ref. provider found      This document has been electronically signed by Savanah Amato DO  April 16, 2024 21:16 EDT    Savanah Amato DO, FACOI  [address] [place] [address]  [phone] (office)    Part of this note may be an  electronic transcription/translation of spoken language to printed text using the Dragon Dictation System.

## 2024-04-18 ENCOUNTER — ANTICOAGULATION VISIT (OUTPATIENT)
Dept: PHARMACY | Facility: HOSPITAL | Age: 24
End: 2024-04-18
Payer: COMMERCIAL

## 2024-04-18 ENCOUNTER — SPECIALTY PHARMACY (OUTPATIENT)
Dept: PHARMACY | Facility: HOSPITAL | Age: 24
End: 2024-04-18
Payer: COMMERCIAL

## 2024-04-18 NOTE — PROGRESS NOTES
[facility]  Anticoagulation Management: Direct Oral Anticoagulant     Juvenal Anderson is a 23 y.o. male seen in the medication management clinic today for anticoagulation management. Patient was referred to clinic by Dr. Savanah Amato. The patient has been prescribed Eliquis for history of bilateral PE and LLE DVT. The patient reports medication access issues with this medication.     Patient denies any s/sx of bleeding and s/sx of clot/stroke. He also denies any missed doses of Eliquis. He reports that Eliquis is the only medication that he is currently taking and does not have any drug allergies.    Past Medical History:   Diagnosis Date    ADHD     Anxiety     Deep vein thrombosis (DVT) of popliteal vein of left lower extremity 2021    MDD (major depressive disorder)     Medically noncompliant 2021    Morbid obesity 10/22/2020    Oppositional defiant disorder     Tobacco use 2021     Social History     Socioeconomic History    Marital status: Single   Tobacco Use    Smoking status: Former     Average packs/day: 0.5 packs/day for 5.0 years (2.5 ttl pk-yrs)     Types: Cigarettes     Start date: 2023     Quit date: 2017     Years since quittin.2    Smokeless tobacco: Current     Types: Snuff    Tobacco comments:     Smokes 0.5 packs of cigarettes a day when he has them    Vaping Use    Vaping status: Every Day    Substances: Nicotine    Devices: Refillable tank   Substance and Sexual Activity    Alcohol use: Yes    Drug use: No     Comment: denies    Sexual activity: Never     No Known Allergies  Current Outpatient Medications   Medication Sig Dispense Refill    apixaban (ELIQUIS) 5 MG tablet tablet Take 1 tablet by mouth 2 (Two) Times a Day. 60 tablet 5     No current facility-administered medications for this visit.       Labs:     WBC   Date Value Ref Range Status   2024 7.64 3.40 - 10.80 10*3/mm3 Final     RBC   Date Value Ref Range Status   2024 5.36 4.14 -  5.80 10*6/mm3 Final     Hemoglobin   Date Value Ref Range Status   04/13/2024 15.2 13.0 - 17.7 g/dL Final     Hematocrit   Date Value Ref Range Status   04/13/2024 45.6 37.5 - 51.0 % Final     MCV   Date Value Ref Range Status   04/13/2024 85.1 79.0 - 97.0 fL Final     MCH   Date Value Ref Range Status   04/13/2024 28.4 26.6 - 33.0 pg Final     MCHC   Date Value Ref Range Status   04/13/2024 33.3 31.5 - 35.7 g/dL Final     RDW   Date Value Ref Range Status   04/13/2024 13.4 12.3 - 15.4 % Final     RDW-SD   Date Value Ref Range Status   04/13/2024 41.5 37.0 - 54.0 fl Final     MPV   Date Value Ref Range Status   04/13/2024 10.0 6.0 - 12.0 fL Final     Platelets   Date Value Ref Range Status   04/13/2024 264 140 - 450 10*3/mm3 Final     Neutrophil %   Date Value Ref Range Status   04/13/2024 57.7 42.7 - 76.0 % Final     Lymphocyte %   Date Value Ref Range Status   04/13/2024 29.8 19.6 - 45.3 % Final     Monocyte %   Date Value Ref Range Status   04/13/2024 10.7 5.0 - 12.0 % Final     Eosinophil %   Date Value Ref Range Status   04/13/2024 1.0 0.3 - 6.2 % Final     Basophil %   Date Value Ref Range Status   04/13/2024 0.5 0.0 - 1.5 % Final     Immature Grans %   Date Value Ref Range Status   04/13/2024 0.3 0.0 - 0.5 % Final     Neutrophils, Absolute   Date Value Ref Range Status   04/13/2024 4.40 1.70 - 7.00 10*3/mm3 Final     Lymphocytes, Absolute   Date Value Ref Range Status   04/13/2024 2.28 0.70 - 3.10 10*3/mm3 Final     Monocytes, Absolute   Date Value Ref Range Status   04/13/2024 0.82 0.10 - 0.90 10*3/mm3 Final     Eosinophils, Absolute   Date Value Ref Range Status   04/13/2024 0.08 0.00 - 0.40 10*3/mm3 Final     Basophils, Absolute   Date Value Ref Range Status   04/13/2024 0.04 0.00 - 0.20 10*3/mm3 Final     Immature Grans, Absolute   Date Value Ref Range Status   04/13/2024 0.02 0.00 - 0.05 10*3/mm3 Final     nRBC   Date Value Ref Range Status   04/13/2024 0.0 0.0 - 0.2 /100 WBC Final     Assessment:      Dosing is appropriate for indication.     A drug-drug interactions check was made. No interactions according to literature.    Other medications that may increase risk of bleeding include: N/A    Plan:     Will order Eliquis 5 mg BID.    Pt will continue to follow up with Dr. Amato as well as Dr. Miller.  Continue to monitor CBC, aPTT, PT, serum creatinine, and liver function at least annually.   Educated patient on:   S/Sx of bleeding and what to watch for, including neurological impairment and S/Sx of a clot/stroke.  Importance of informing all providers (including dentists) that she is on anticoagulation and to always consult with cardiologist prior to holding or adjusting dose  Avoiding invasive procedure, activities that could cause injuries and to always call 911 or go to ED with any bleeding emergency  Call Medication Management Clinic or Cardiology with any new medications (including herbal supplements or OTC products) as drug-drug interactions may occur  Pt will follow up with Medication Management Clinic in 6 months, or sooner if needed.    María Elena Al, PharmD  04/18/24  08:28 EDT

## 2024-04-18 NOTE — PROGRESS NOTES
[facility]ication Management Clinic  Anticoagulation Management: Direct Oral Anticoagulant     Juvenal Anderson is a 23 y.o. male seen in the medication management clinic today for anticoagulation management. Patient was referred to clinic by Dr. Savanah Amato. The patient has been prescribed Eliquis for history of bilateral PE and LLE DVT. The patient reports medication access issues with this medication.     Patient denies any s/sx of bleeding and s/sx of clot/stroke. He also denies any missed doses of Eliquis. He reports that Eliquis is the only medication that he is currently taking and does not have any drug allergies.    Past Medical History:   Diagnosis Date    ADHD     Anxiety     Deep vein thrombosis (DVT) of popliteal vein of left lower extremity 2021    MDD (major depressive disorder)     Medically noncompliant 2021    Morbid obesity 10/22/2020    Oppositional defiant disorder     Tobacco use 2021     Social History     Socioeconomic History    Marital status: Single   Tobacco Use    Smoking status: Former     Average packs/day: 0.5 packs/day for 5.0 years (2.5 ttl pk-yrs)     Types: Cigarettes     Start date: 2023     Quit date: 2017     Years since quittin.2    Smokeless tobacco: Current     Types: Snuff    Tobacco comments:     Smokes 0.5 packs of cigarettes a day when he has them    Vaping Use    Vaping status: Every Day    Substances: Nicotine    Devices: Refillable tank   Substance and Sexual Activity    Alcohol use: Yes    Drug use: No     Comment: denies    Sexual activity: Never     No Known Allergies  Current Outpatient Medications   Medication Sig Dispense Refill    Apixaban Starter Pack tablet therapy pack Take two 5 mg tablets by mouth every 12 hours for 7 days. Followed by one 5 mg tablet every 12 hours. (Dispense starter pack if available) 74 tablet 0     No current facility-administered medications for this visit.       Labs:     WBC   Date Value Ref Range Status    04/13/2024 7.64 3.40 - 10.80 10*3/mm3 Final     RBC   Date Value Ref Range Status   04/13/2024 5.36 4.14 - 5.80 10*6/mm3 Final     Hemoglobin   Date Value Ref Range Status   04/13/2024 15.2 13.0 - 17.7 g/dL Final     Hematocrit   Date Value Ref Range Status   04/13/2024 45.6 37.5 - 51.0 % Final     MCV   Date Value Ref Range Status   04/13/2024 85.1 79.0 - 97.0 fL Final     MCH   Date Value Ref Range Status   04/13/2024 28.4 26.6 - 33.0 pg Final     MCHC   Date Value Ref Range Status   04/13/2024 33.3 31.5 - 35.7 g/dL Final     RDW   Date Value Ref Range Status   04/13/2024 13.4 12.3 - 15.4 % Final     RDW-SD   Date Value Ref Range Status   04/13/2024 41.5 37.0 - 54.0 fl Final     MPV   Date Value Ref Range Status   04/13/2024 10.0 6.0 - 12.0 fL Final     Platelets   Date Value Ref Range Status   04/13/2024 264 140 - 450 10*3/mm3 Final     Neutrophil %   Date Value Ref Range Status   04/13/2024 57.7 42.7 - 76.0 % Final     Lymphocyte %   Date Value Ref Range Status   04/13/2024 29.8 19.6 - 45.3 % Final     Monocyte %   Date Value Ref Range Status   04/13/2024 10.7 5.0 - 12.0 % Final     Eosinophil %   Date Value Ref Range Status   04/13/2024 1.0 0.3 - 6.2 % Final     Basophil %   Date Value Ref Range Status   04/13/2024 0.5 0.0 - 1.5 % Final     Immature Grans %   Date Value Ref Range Status   04/13/2024 0.3 0.0 - 0.5 % Final     Neutrophils, Absolute   Date Value Ref Range Status   04/13/2024 4.40 1.70 - 7.00 10*3/mm3 Final     Lymphocytes, Absolute   Date Value Ref Range Status   04/13/2024 2.28 0.70 - 3.10 10*3/mm3 Final     Monocytes, Absolute   Date Value Ref Range Status   04/13/2024 0.82 0.10 - 0.90 10*3/mm3 Final     Eosinophils, Absolute   Date Value Ref Range Status   04/13/2024 0.08 0.00 - 0.40 10*3/mm3 Final     Basophils, Absolute   Date Value Ref Range Status   04/13/2024 0.04 0.00 - 0.20 10*3/mm3 Final     Immature Grans, Absolute   Date Value Ref Range Status   04/13/2024 0.02 0.00 - 0.05 10*3/mm3  Final     [facility]   Date Value Ref Range Status   04/13/2024 0.0 0.0 - 0.2 /100 WBC Final     Assessment:     Dosing is appropriate for indication.     A drug-drug interactions check was made. No interactions according to literature.    Other medications that may increase risk of bleeding include: N/A    Plan:     Will order Eliquis 5 mg BID.    Pt will continue to follow up with Dr. Amato as well as Dr. Miller.  Continue to monitor CBC, aPTT, PT, serum creatinine, and liver function at least annually.   Educated patient on:   S/Sx of bleeding and what to watch for, including neurological impairment and S/Sx of a clot/stroke.  Importance of informing all providers (including dentists) that she is on anticoagulation and to always consult with cardiologist prior to holding or adjusting dose  Avoiding invasive procedure, activities that could cause injuries and to always call 911 or go to ED with any bleeding emergency  Call Medication Management Clinic or Cardiology with any new medications (including herbal supplements or OTC products) as drug-drug interactions may occur  Pt will follow up with Medication Management Clinic in 6 months, or sooner if needed.    María Elena Al, PharmD  04/18/24  08:07 EDT

## 2024-04-29 ENCOUNTER — CONSULT (OUTPATIENT)
Dept: ONCOLOGY | Facility: CLINIC | Age: 24
End: 2024-04-29
Payer: COMMERCIAL

## 2024-04-29 ENCOUNTER — LAB (OUTPATIENT)
Dept: ONCOLOGY | Facility: CLINIC | Age: 24
End: 2024-04-29
Payer: COMMERCIAL

## 2024-04-29 VITALS
TEMPERATURE: 97.5 F | DIASTOLIC BLOOD PRESSURE: 72 MMHG | WEIGHT: 315 LBS | OXYGEN SATURATION: 94 % | BODY MASS INDEX: 39.17 KG/M2 | HEART RATE: 75 BPM | SYSTOLIC BLOOD PRESSURE: 124 MMHG | RESPIRATION RATE: 18 BRPM | HEIGHT: 75 IN

## 2024-04-29 DIAGNOSIS — D68.59 HYPERCOAGULOPATHY: ICD-10-CM

## 2024-04-29 DIAGNOSIS — I82.532 CHRONIC DEEP VEIN THROMBOSIS (DVT) OF POPLITEAL VEIN OF LEFT LOWER EXTREMITY: ICD-10-CM

## 2024-04-29 DIAGNOSIS — D68.59 HYPERCOAGULOPATHY: Primary | ICD-10-CM

## 2024-04-29 LAB
ALBUMIN SERPL-MCNC: 4.2 G/DL (ref 3.5–5.2)
ALBUMIN/GLOB SERPL: 1.4 G/DL
ALP SERPL-CCNC: 68 U/L (ref 39–117)
ALT SERPL W P-5'-P-CCNC: 32 U/L (ref 1–41)
ANION GAP SERPL CALCULATED.3IONS-SCNC: 11.7 MMOL/L (ref 5–15)
APTT PPP: 27.9 SECONDS (ref 26.5–34.5)
AST SERPL-CCNC: 30 U/L (ref 1–40)
BASOPHILS # BLD AUTO: 0.03 10*3/MM3 (ref 0–0.2)
BASOPHILS NFR BLD AUTO: 0.3 % (ref 0–1.5)
BILIRUB SERPL-MCNC: 0.2 MG/DL (ref 0–1.2)
BUN SERPL-MCNC: 16 MG/DL (ref 6–20)
BUN/CREAT SERPL: 21.9 (ref 7–25)
CALCIUM SPEC-SCNC: 9.2 MG/DL (ref 8.6–10.5)
CHLORIDE SERPL-SCNC: 103 MMOL/L (ref 98–107)
CO2 SERPL-SCNC: 25.3 MMOL/L (ref 22–29)
CREAT SERPL-MCNC: 0.73 MG/DL (ref 0.76–1.27)
DEPRECATED RDW RBC AUTO: 42.1 FL (ref 37–54)
EGFRCR SERPLBLD CKD-EPI 2021: 132.7 ML/MIN/1.73
EOSINOPHIL # BLD AUTO: 0.1 10*3/MM3 (ref 0–0.4)
EOSINOPHIL NFR BLD AUTO: 1.1 % (ref 0.3–6.2)
ERYTHROCYTE [DISTWIDTH] IN BLOOD BY AUTOMATED COUNT: 13.5 % (ref 12.3–15.4)
GLOBULIN UR ELPH-MCNC: 3.1 GM/DL
GLUCOSE SERPL-MCNC: 104 MG/DL (ref 65–99)
HCT VFR BLD AUTO: 42.3 % (ref 37.5–51)
HGB BLD-MCNC: 13.9 G/DL (ref 13–17.7)
IMM GRANULOCYTES # BLD AUTO: 0.03 10*3/MM3 (ref 0–0.05)
IMM GRANULOCYTES NFR BLD AUTO: 0.3 % (ref 0–0.5)
INR PPP: 0.99 (ref 0.9–1.1)
LYMPHOCYTES # BLD AUTO: 3.44 10*3/MM3 (ref 0.7–3.1)
LYMPHOCYTES NFR BLD AUTO: 36.6 % (ref 19.6–45.3)
MCH RBC QN AUTO: 27.9 PG (ref 26.6–33)
MCHC RBC AUTO-ENTMCNC: 32.9 G/DL (ref 31.5–35.7)
MCV RBC AUTO: 84.9 FL (ref 79–97)
MONOCYTES # BLD AUTO: 0.85 10*3/MM3 (ref 0.1–0.9)
MONOCYTES NFR BLD AUTO: 9 % (ref 5–12)
NEUTROPHILS NFR BLD AUTO: 4.95 10*3/MM3 (ref 1.7–7)
NEUTROPHILS NFR BLD AUTO: 52.7 % (ref 42.7–76)
NRBC BLD AUTO-RTO: 0 /100 WBC (ref 0–0.2)
PLATELET # BLD AUTO: 271 10*3/MM3 (ref 140–450)
PMV BLD AUTO: 10 FL (ref 6–12)
POTASSIUM SERPL-SCNC: 3.7 MMOL/L (ref 3.5–5.2)
PROT SERPL-MCNC: 7.3 G/DL (ref 6–8.5)
PROTHROMBIN TIME: 13.6 SECONDS (ref 12.1–14.7)
RBC # BLD AUTO: 4.98 10*6/MM3 (ref 4.14–5.8)
SODIUM SERPL-SCNC: 140 MMOL/L (ref 136–145)
WBC NRBC COR # BLD AUTO: 9.4 10*3/MM3 (ref 3.4–10.8)

## 2024-04-29 PROCEDURE — 85306 CLOT INHIBIT PROT S FREE: CPT | Performed by: INTERNAL MEDICINE

## 2024-04-29 PROCEDURE — 85613 RUSSELL VIPER VENOM DILUTED: CPT | Performed by: INTERNAL MEDICINE

## 2024-04-29 PROCEDURE — 80053 COMPREHEN METABOLIC PANEL: CPT | Performed by: INTERNAL MEDICINE

## 2024-04-29 PROCEDURE — 85732 THROMBOPLASTIN TIME PARTIAL: CPT | Performed by: INTERNAL MEDICINE

## 2024-04-29 PROCEDURE — 99203 OFFICE O/P NEW LOW 30 MIN: CPT | Performed by: INTERNAL MEDICINE

## 2024-04-29 PROCEDURE — 85303 CLOT INHIBIT PROT C ACTIVITY: CPT | Performed by: INTERNAL MEDICINE

## 2024-04-29 PROCEDURE — 85025 COMPLETE CBC W/AUTO DIFF WBC: CPT | Performed by: INTERNAL MEDICINE

## 2024-04-29 PROCEDURE — 85300 ANTITHROMBIN III ACTIVITY: CPT | Performed by: INTERNAL MEDICINE

## 2024-04-29 PROCEDURE — 85730 THROMBOPLASTIN TIME PARTIAL: CPT | Performed by: INTERNAL MEDICINE

## 2024-04-29 PROCEDURE — 85610 PROTHROMBIN TIME: CPT | Performed by: INTERNAL MEDICINE

## 2024-04-29 PROCEDURE — 85305 CLOT INHIBIT PROT S TOTAL: CPT | Performed by: INTERNAL MEDICINE

## 2024-04-29 PROCEDURE — 86147 CARDIOLIPIN ANTIBODY EA IG: CPT | Performed by: INTERNAL MEDICINE

## 2024-04-29 PROCEDURE — 81241 F5 GENE: CPT | Performed by: INTERNAL MEDICINE

## 2024-04-29 PROCEDURE — 85301 ANTITHROMBIN III ANTIGEN: CPT | Performed by: INTERNAL MEDICINE

## 2024-04-29 PROCEDURE — 86146 BETA-2 GLYCOPROTEIN ANTIBODY: CPT | Performed by: INTERNAL MEDICINE

## 2024-04-29 PROCEDURE — 81240 F2 GENE: CPT | Performed by: INTERNAL MEDICINE

## 2024-04-29 PROCEDURE — 85302 CLOT INHIBIT PROT C ANTIGEN: CPT | Performed by: INTERNAL MEDICINE

## 2024-04-29 PROCEDURE — 36415 COLL VENOUS BLD VENIPUNCTURE: CPT | Performed by: INTERNAL MEDICINE

## 2024-04-29 PROCEDURE — 86148 ANTI-PHOSPHOLIPID ANTIBODY: CPT | Performed by: INTERNAL MEDICINE

## 2024-04-29 NOTE — PROGRESS NOTES
Subjective     Date: 4/29/2024    Referring Provider  Savanah Amato DO    Chief Complaint  History of DVT, PE     Subjective          Juvenal Anderson is a 23 y.o. male who presents today to [facility] for initial evaluation .    HPI:   23 y.o. male with past medical history of DVT and PE who presents for consultation.    Patient reports lifting heavy objects that caused left calf pain, which resolved, followed by significant swelling and pain two weeks later in 2020. He presented to the ED 10/21/2020 when found to have left popliteal vein thrombosis. He was also noted to be hypoxic to SpO2 of 87%, CTA of the chest show left and right segmental pulmonary emboli without heart strain. He was started on lovenox while inpatient, transitioned to Eliquis.     Pt reports completing 7-8 months of Eliquis, which he discontinued when he lost his health insurance at the time.     Denies any other history of VTE. He was seen at [facility] ED  4/14/2024 for acute chest pain similar to previous PE pain. U/S of lower extremity was negative for DVT. CXR was performed which was unremarkable. He was started on Eliquis again. Chest pain has subsided.     He denies having a sedentary lifestyle, works for ~12 hours/day at [employer] Does have a significant family history of VTE and arterial thrombosis, mother with many known blood clots and maternal grandmother with heart disease and CVAs.     He smokes nicotine and CBD vapes, denies alcohol use or any other drug use. Very active and works for 12 hours/day.         The following portions of the patient's history were reviewed and updated as appropriate: allergies, current medications, past family history, past medical history, past social history, past surgical history and problem list.    Objective     Objective     Allergy:   No Known Allergies     Current Medications:   Current Outpatient Medications   Medication Sig Dispense Refill    apixaban  "(ELIQUIS) 5 MG tablet tablet Take 1 tablet by mouth 2 (Two) Times a Day. 60 tablet 5     No current facility-administered medications for this visit.       Past Medical History:  Past Medical History:   Diagnosis Date    ADHD     Anxiety     Deep vein thrombosis (DVT) of popliteal vein of left lower extremity 2021    MDD (major depressive disorder)     Medically noncompliant 2021    Morbid obesity 10/22/2020    Oppositional defiant disorder     Tobacco use 2021       Past Surgical History:  History reviewed. No pertinent surgical history.    Social History:  Social History     Socioeconomic History    Marital status: Single   Tobacco Use    Smoking status: Former     Average packs/day: 0.5 packs/day for 5.0 years (2.5 ttl pk-yrs)     Types: Cigarettes     Start date: 2023     Quit date:      Years since quittin.3    Smokeless tobacco: Current     Types: Snuff    Tobacco comments:     Smokes 0.5 packs of cigarettes a day when he has them    Vaping Use    Vaping status: Every Day    Substances: Nicotine    Devices: Refillable tank   Substance and Sexual Activity    Alcohol use: Yes    Drug use: No     Comment: denies    Sexual activity: Never         Family History:  Family History   Problem Relation Age of Onset    Pulmonary embolism Mother     Heart disease Maternal Grandmother     Stroke Maternal Grandmother     Heart attack Maternal Grandmother        Review of Systems:  Review of Systems   All other systems reviewed and are negative.      Vital Signs:   /72   Pulse 75   Temp 97.5 °F (36.4 °C) (Temporal)   Resp 18   Ht 190.5 cm (75\")   Wt (!) 163 kg (360 lb 3.2 oz)   SpO2 94%   BMI 45.02 kg/m²      Physical Exam:  Physical Exam  Vitals reviewed.   Constitutional:       General: He is not in acute distress.     Appearance: Normal appearance. He is not ill-appearing.   HENT:      Head: Normocephalic and atraumatic.      Mouth/Throat:      Mouth: Mucous membranes are moist. " "     Pharynx: Oropharynx is clear.   Eyes:      Conjunctiva/sclera: Conjunctivae normal.      Pupils: Pupils are equal, round, and reactive to light.   Cardiovascular:      Rate and Rhythm: Normal rate and regular rhythm.      Heart sounds: No murmur heard.  Pulmonary:      Effort: Pulmonary effort is normal. No respiratory distress.      Breath sounds: Normal breath sounds. No wheezing.   Abdominal:      General: Abdomen is flat. Bowel sounds are normal. There is no distension.      Palpations: Abdomen is soft. There is no mass.      Tenderness: There is no abdominal tenderness. There is no guarding.   Musculoskeletal:         General: No swelling. Normal range of motion.      Cervical back: Normal range of motion.      Left lower leg: Edema present.   Lymphadenopathy:      Cervical: No cervical adenopathy.   Skin:     General: Skin is warm and dry.   Neurological:      General: No focal deficit present.      Mental Status: He is alert and oriented to person, place, and time. Mental status is at baseline.   Psychiatric:         Mood and Affect: Mood normal.         PHQ-9 Score  PHQ-9 Total Score:       Pain Score  Vitals:    04/29/24 1038   BP: 124/72   Pulse: 75   Resp: 18   Temp: 97.5 °F (36.4 °C)   TempSrc: Temporal   SpO2: 94%   Weight: (!) 163 kg (360 lb 3.2 oz)   Height: 190.5 cm (75\")   PainSc: 0-No pain       ECOG score: 0           PAINSCOREQUALITYMETRIC:   Vitals:    04/29/24 1038   PainSc: 0-No pain          Lab Review  Lab Results   Component Value Date    WBC 9.40 04/29/2024    HGB 13.9 04/29/2024    HCT 42.3 04/29/2024    MCV 84.9 04/29/2024    RDW 13.5 04/29/2024     04/29/2024    NEUTRORELPCT 52.7 04/29/2024    LYMPHORELPCT 36.6 04/29/2024    MONORELPCT 9.0 04/29/2024    EOSRELPCT 1.1 04/29/2024    BASORELPCT 0.3 04/29/2024    NEUTROABS 4.95 04/29/2024    LYMPHSABS 3.44 (H) 04/29/2024     Lab Results   Component Value Date     04/29/2024    K 3.7 04/29/2024    CO2 25.3 04/29/2024    CL " 103 04/29/2024    BUN 16 04/29/2024    CREATININE 0.73 (L) 04/29/2024    EGFRIFNONA >150 02/07/2022    EGFRIFAFRI  08/02/2017      Comment:      Unable to calculate GFR, patient age <=18.    GLUCOSE 104 (H) 04/29/2024    CALCIUM 9.2 04/29/2024    ALKPHOS 68 04/29/2024    AST 30 04/29/2024    ALT 32 04/29/2024    BILITOT 0.2 04/29/2024    ALBUMIN 4.2 04/29/2024    PROTEINTOT 7.3 04/29/2024    MG 2.0 07/01/2021    PHOS 4.2 07/01/2021       Radiology Results  US Venous Doppler Lower Extremity Left (duplex) (04/14/2024 04:23)   FINDINGS:  Normal color flow imaging. Normal venous compression and normal waveform  augmentation.     IMPRESSION:  1.  No DVT in the left lower extremity.    XR Chest 2 View (04/13/2024 22:57)   FINDINGS:  Normal heart size  No lobar consolidation or edema  No pleural effusion or pneumothorax  No fracture or foreign body.     IMPRESSION:  1.  No acute process seen in the chest.     CT Chest Pulmonary Embolism (06/30/2021 21:31)   FINDINGS:   No definite new or increased pulmonary embolism. Bilateral PE are seen in the left and right segmental and subsegmental pulmonary artery branches. These do not appear definitely changed from the prior CTA of the chest. The lung fields appear grossly  clear. Cardiac structures appear within normal limits. No acute findings within the upper abdomen. No acute osseous abnormality.     IMPRESSION:  1. No definite new or increased pulmonary embolism. Bilateral PE are seen in the left and right segmental and subsegmental pulmonary artery branches. These do not appear definitely changed from the prior CTA of the chest.  2. No acute findings in the chest.    US Venous Doppler Lower Extremity Bilateral (duplex) (06/30/2021 19:48)   FINDINGS:    RIGHT DEEP VEINS:  Unremarkable.  No DVT in the right common femoral,  femoral, proximal deep femoral or popliteal veins.  The veins  demonstrate normal color flow, are normally compressible, with normal  phasic flow and/or  augmentation response.    LEFT DEEP VEINS:  This patient has known prior left popliteal vein  thrombosis. On today's study there is continued DVT of the left  popliteal and peroneal veins.    SOFT TISSUES:  No acute findings.  No popliteal cyst.     IMPRESSION:    This patient has known prior left popliteal vein thrombosis. On  today's study there is continued DVT of the left popliteal and peroneal  veins.    CT Chest Pulmonary Embolism (10/21/2020 20:44)   FINDINGS:    There is bibasilar atalectasis.    Left and right segmental pulmonary emboli. No definite evidence of right  heart strain.No pleural effusion.  There is no thoracic adenopathy.   Incidentally imaged upper abdomen is unremarkable.   Bone windows show no acute osseous abnormality.     IMPRESSION:  Left and right segmental pulmonary emboli. No definite evidence of right  heart strain.    US Venous Doppler Lower Extremity Left (duplex) (10/21/2020 17:32)   FINDINGS:    Deep veins:  The left popliteal vein shows no compressibility, venous  flow or augmentation. Consistent with left popliteal vein thrombosis.    Superficial veins:  Unremarkable.  No thrombus in the visualized great  saphenous vein.    Soft tissues:  No acute findings.  No popliteal cyst.     IMPRESSION:    The left popliteal vein shows no compressibility, venous flow or  augmentation. Consistent with left popliteal vein thrombosis.        Assessment / Plan         Assessment and Plan   Juvenal Anderson is a 23 y.o. presents for    History of VTE  -Patient with provoked? LLE popliteal vein thrombosis and bilateral pulmonary emboli 10/2020. He reports lifting heavy objects two weeks prior to this event causing left leg pain and muscle injury. He reports significant left leg swelling prompting ED visit when he was found to have LLE DVT and b/l PE.  -Completed 6-8 months of AC in 2021, however has re started when he visited [facility] for chest pain 4/2024. No findings of VTE at that time.   -Denies  sedentary lifestyle. Smokes/vapes nicotine and CBD vapes. Denies regular alcohol use. Does have significant family history of VTEs in his mother and heart disease/CVA in his maternal grandmother   -We discussed the risk factors for acute thromboemboli which include surgery, trauma, immobilization, certain therapies, thrombophilia or malignancy.  We discussed screening him for underlying thrombophilia for inherited thrombophilia such as factor V Leiden mutation, prothrombin [id number] mutation, protein S deficiency, protein C deficiency, Antithrombin deficiency, and for antiphospholipid antibody syndrome  - Check for Factor V Leiden, lupus anticoagulant, beta 2 glycoprotein antibody, anticardiolipin antibody, antithrombin, prothrombin, protein C and S  -He is currently on Eliquis 5 mg BID  -Refer patient to lymphedema clinic for LLE swelling        Discussed possible differential diagnoses, testing, treatment, recommended non-pharmacological interventions, risks, warning signs to monitor for that would indicate need for follow-up in clinic or ER. If no improvement with these regimens or you have new or worsening symptoms follow-up. Patient verbalizes understanding and agreement with plan of care. Denies further needs or concerns.     Patient was given instructions and counseling regarding her condition and for health maintenance advised.       All questions were answered to his satisfaction.      ACO / KAIA/Other  Quality measures  -  Juvenal Anderson did not receive 2023 flu vaccine.  This was recommended.  -  Juvenal Anderson reports a pain score of 0.  Given his pain assessment as noted, treatment options were discussed and the following options were decided upon as a follow-up plan to address the patient's pain: continuation of current treatment plan for pain.  -  Current outpatient and discharge medications have been reconciled for the patient.  Reviewed by: Norma Miller MD        Meds ordered during this visit  No  orders of the defined types were placed in this encounter.      Visit Diagnoses    ICD-10-CM ICD-9-CM   1. Hypercoagulopathy  D68.59 289.81   2. Chronic deep vein thrombosis (DVT) of popliteal vein of left lower extremity  I82.532 453.51       Follow Up   In 4-5 weeks to review the above work up        This document has been electronically signed by Norma Miller MD   April 29, 2024 12:55 EDT    Dictated Utilizing Dragon Dictation: Part of this note may be an electronic transcription/translation of spoken language to printed text using the Dragon Dictation System.

## 2024-04-29 NOTE — PROGRESS NOTES
Venipuncture Blood Specimen Collection  Venipuncture performed in Right arm by Dyan Giles MA with good hemostasis. Patient tolerated the procedure well without complications.   04/29/24   Dyan Giles MA

## 2024-05-01 LAB
AT III ACT/NOR PPP CHRO: 104 % (ref 75–135)
AT III AG ACT/NOR PPP IA: 89 % (ref 72–124)
B2 GLYCOPROT1 IGA SER-ACNC: 10 GPI IGA UNITS (ref 0–25)
B2 GLYCOPROT1 IGG SER-ACNC: <9 GPI IGG UNITS (ref 0–20)
B2 GLYCOPROT1 IGM SER-ACNC: <9 GPI IGM UNITS (ref 0–32)
F5 GENE MUT ANL BLD/T: NORMAL
FACTOR II, DNA ANALYSIS: NORMAL
LA 2 SCREEN W REFLEX-IMP: NORMAL
PROT C ACT/NOR PPP: 96 % (ref 73–180)
PROT S ACT/NOR PPP: 62 % (ref 63–140)
SCREEN APTT: 35.4 SEC (ref 0–43.5)
SCREEN DRVVT: 32.6 SEC (ref 0–47)

## 2024-05-02 LAB
CARDIOLIPIN IGA SER IA-ACNC: <9 APL U/ML (ref 0–11)
CARDIOLIPIN IGG SER IA-ACNC: 19 GPL U/ML (ref 0–14)
CARDIOLIPIN IGM SER IA-ACNC: <9 MPL U/ML (ref 0–12)
PROT C AG ACT/NOR PPP IA: 109 % (ref 60–150)
PROT S AG ACT/NOR PPP IA: 68 % (ref 60–150)
PROT S FREE AG ACT/NOR PPP IA: 67 % (ref 61–136)
PS IGA SER-ACNC: <1 APS UNITS (ref 0–19)
PS IGG SER-ACNC: 10 UNITS (ref 0–30)
PS IGM SER-ACNC: 20 UNITS (ref 0–30)

## 2024-05-06 ENCOUNTER — PATIENT ROUNDING (BHMG ONLY) (OUTPATIENT)
Dept: ONCOLOGY | Facility: CLINIC | Age: 24
End: 2024-05-06
Payer: COMMERCIAL

## 2024-05-10 ENCOUNTER — SPECIALTY PHARMACY (OUTPATIENT)
Dept: PHARMACY | Facility: HOSPITAL | Age: 24
End: 2024-05-10
Payer: COMMERCIAL

## 2024-05-10 NOTE — PROGRESS NOTES
Specialty Pharmacy Refill Coordination Note     Juvenal is a 23 y.o. male contacted today regarding refills of Eliquis specialty medication(s).    Reviewed and verified with patient: yes  Specialty medication(s) and dose(s) confirmed: yes    Refill Questions      Flowsheet Row Most Recent Value   Changes to allergies? No   Changes to medications? No   New conditions or infections since last clinic visit No   Unplanned office visit, urgent care, ED, or hospital admission in the last 4 weeks  No   How does patient/caregiver feel medication is working? Very good   Financial problems or insurance changes  No   Since the previous refill, were any specialty medication doses or scheduled injections missed or delayed?  No   Does this patient require a clinical escalation to a pharmacist? No            Delivery Questions      Flowsheet Row Most Recent Value   Copay verified? No   Copay amount $10   Copay form of payment No copayment ($0)                   Follow-up: 30 day(s)     Tea Barbosa, Pharmacy Technician  Specialty Pharmacy Technician

## 2024-07-11 ENCOUNTER — SPECIALTY PHARMACY (OUTPATIENT)
Dept: PHARMACY | Facility: HOSPITAL | Age: 24
End: 2024-07-11
Payer: COMMERCIAL

## 2024-07-11 NOTE — PROGRESS NOTES
Specialty Pharmacy Refill Coordination Note      Name:  Juvenal Anderson  :  2000  Date:  2024         Past Medical History:   Diagnosis Date    ADHD     Anxiety     Deep vein thrombosis (DVT) of popliteal vein of left lower extremity 2021    MDD (major depressive disorder)     Medically noncompliant 2021    Morbid obesity 10/22/2020    Oppositional defiant disorder     Tobacco use 2021       No past surgical history on file.    Social History     Socioeconomic History    Marital status: Single   Tobacco Use    Smoking status: Former     Average packs/day: 0.5 packs/day for 5.0 years (2.5 ttl pk-yrs)     Types: Cigarettes     Start date: 2023     Quit date: 2017     Years since quittin.5    Smokeless tobacco: Current     Types: Snuff    Tobacco comments:     Smokes 0.5 packs of cigarettes a day when he has them    Vaping Use    Vaping status: Every Day    Substances: Nicotine    Devices: Refillable tank   Substance and Sexual Activity    Alcohol use: Yes    Drug use: No     Comment: denies    Sexual activity: Never       Family History   Problem Relation Age of Onset    Pulmonary embolism Mother     Heart disease Maternal Grandmother     Stroke Maternal Grandmother     Heart attack Maternal Grandmother        No Known Allergies    Current Outpatient Medications   Medication Sig Dispense Refill    apixaban (ELIQUIS) 5 MG tablet tablet Take 1 tablet by mouth 2 (Two) Times a Day. 60 tablet 5     No current facility-administered medications for this visit.         ASSESSMENT/PLAN:      Juvenal is a 23 y.o. male contacted today regarding refills of  Eliquis specialty medication(s).    Reviewed and verified with patient:       Specialty medication(s) and dose(s) confirmed: yes    Refill Questions      Flowsheet Row Most Recent Value   Changes to allergies? No   Changes to medications? No   New conditions or infections since last clinic visit No   Unplanned office visit, urgent care, ED,  or hospital admission in the last 4 weeks  No   How does patient/caregiver feel medication is working? Very good   Financial problems or insurance changes  No   Since the previous refill, were any specialty medication doses or scheduled injections missed or delayed?  No   Does this patient require a clinical escalation to a pharmacist? No            Delivery Questions      Flowsheet Row Most Recent Value   Copay verified? No   Copay amount na   Copay form of payment No copayment ($0)                   Follow-up: 30 day(s)     Molly Williamson Pharmacy Technician  Specialty Pharmacy Technician

## 2024-07-25 NOTE — PROGRESS NOTES
Spoke with patient regarding Eliquis refill. He has stopped taking this medication. Counseled on the risks of stopping Eliquis but patient is unwilling to take medication at this time. Will discharge patient from clinic and notify referring provider, Dr. Amato.    María Elena Al, PharmD  7/30/2024  09:38 EDT

## 2025-08-22 ENCOUNTER — OFFICE VISIT (OUTPATIENT)
Dept: FAMILY MEDICINE CLINIC | Facility: CLINIC | Age: 25
End: 2025-08-22
Payer: COMMERCIAL

## 2025-08-22 VITALS
OXYGEN SATURATION: 97 % | TEMPERATURE: 98.2 F | HEART RATE: 83 BPM | HEIGHT: 75 IN | SYSTOLIC BLOOD PRESSURE: 132 MMHG | WEIGHT: 315 LBS | DIASTOLIC BLOOD PRESSURE: 72 MMHG | BODY MASS INDEX: 39.17 KG/M2

## 2025-08-22 DIAGNOSIS — Z86.718 HISTORY OF DVT OF LOWER EXTREMITY: Primary | ICD-10-CM

## 2025-08-22 DIAGNOSIS — I10 PRIMARY HYPERTENSION: ICD-10-CM

## 2025-08-22 DIAGNOSIS — G89.29 CHRONIC BILATERAL THORACIC BACK PAIN: ICD-10-CM

## 2025-08-22 DIAGNOSIS — M54.6 CHRONIC BILATERAL THORACIC BACK PAIN: ICD-10-CM

## 2025-08-22 DIAGNOSIS — Z86.711 HISTORY OF PULMONARY EMBOLISM: ICD-10-CM

## 2025-08-22 DIAGNOSIS — E66.01 MORBID OBESITY: ICD-10-CM

## 2025-08-22 PROCEDURE — 83036 HEMOGLOBIN GLYCOSYLATED A1C: CPT | Performed by: NURSE PRACTITIONER

## 2025-08-22 PROCEDURE — 80061 LIPID PANEL: CPT | Performed by: NURSE PRACTITIONER

## 2025-08-22 PROCEDURE — 84439 ASSAY OF FREE THYROXINE: CPT | Performed by: NURSE PRACTITIONER

## 2025-08-22 PROCEDURE — 80050 GENERAL HEALTH PANEL: CPT | Performed by: NURSE PRACTITIONER

## 2025-08-22 PROCEDURE — 83690 ASSAY OF LIPASE: CPT | Performed by: NURSE PRACTITIONER

## 2025-08-22 PROCEDURE — 82150 ASSAY OF AMYLASE: CPT | Performed by: NURSE PRACTITIONER

## 2025-08-22 RX ORDER — LOSARTAN POTASSIUM 25 MG/1
25 TABLET ORAL DAILY
Qty: 30 TABLET | Refills: 3 | Status: SHIPPED | OUTPATIENT
Start: 2025-08-22

## 2025-08-23 LAB
ALBUMIN SERPL-MCNC: 4.2 G/DL (ref 3.5–5.2)
ALBUMIN/GLOB SERPL: 1.2 G/DL
ALP SERPL-CCNC: 66 U/L (ref 39–117)
ALT SERPL W P-5'-P-CCNC: 47 U/L (ref 1–41)
AMYLASE SERPL-CCNC: 37 U/L (ref 28–100)
ANION GAP SERPL CALCULATED.3IONS-SCNC: 11.8 MMOL/L (ref 5–15)
AST SERPL-CCNC: 33 U/L (ref 1–40)
BASOPHILS # BLD AUTO: 0.03 10*3/MM3 (ref 0–0.2)
BASOPHILS NFR BLD AUTO: 0.3 % (ref 0–1.5)
BILIRUB SERPL-MCNC: 0.2 MG/DL (ref 0–1.2)
BUN SERPL-MCNC: 9 MG/DL (ref 6–20)
BUN/CREAT SERPL: 13.4 (ref 7–25)
CALCIUM SPEC-SCNC: 9.5 MG/DL (ref 8.6–10.5)
CHLORIDE SERPL-SCNC: 107 MMOL/L (ref 98–107)
CHOLEST SERPL-MCNC: 115 MG/DL (ref 0–200)
CO2 SERPL-SCNC: 22.2 MMOL/L (ref 22–29)
CREAT SERPL-MCNC: 0.67 MG/DL (ref 0.76–1.27)
DEPRECATED RDW RBC AUTO: 42.3 FL (ref 37–54)
EGFRCR SERPLBLD CKD-EPI 2021: 135.4 ML/MIN/1.73
EOSINOPHIL # BLD AUTO: 0.1 10*3/MM3 (ref 0–0.4)
EOSINOPHIL NFR BLD AUTO: 1.2 % (ref 0.3–6.2)
ERYTHROCYTE [DISTWIDTH] IN BLOOD BY AUTOMATED COUNT: 13.4 % (ref 12.3–15.4)
GLOBULIN UR ELPH-MCNC: 3.4 GM/DL
GLUCOSE SERPL-MCNC: 80 MG/DL (ref 65–99)
HBA1C MFR BLD: 5.4 % (ref 4.8–5.6)
HCT VFR BLD AUTO: 43.7 % (ref 37.5–51)
HDLC SERPL-MCNC: 33 MG/DL (ref 40–60)
HGB BLD-MCNC: 14.2 G/DL (ref 13–17.7)
IMM GRANULOCYTES # BLD AUTO: 0.02 10*3/MM3 (ref 0–0.05)
IMM GRANULOCYTES NFR BLD AUTO: 0.2 % (ref 0–0.5)
LDLC SERPL CALC-MCNC: 59 MG/DL (ref 0–100)
LDLC/HDLC SERPL: 1.72 {RATIO}
LIPASE SERPL-CCNC: 22 U/L (ref 13–60)
LYMPHOCYTES # BLD AUTO: 2.98 10*3/MM3 (ref 0.7–3.1)
LYMPHOCYTES NFR BLD AUTO: 34.3 % (ref 19.6–45.3)
MCH RBC QN AUTO: 28.1 PG (ref 26.6–33)
MCHC RBC AUTO-ENTMCNC: 32.5 G/DL (ref 31.5–35.7)
MCV RBC AUTO: 86.5 FL (ref 79–97)
MONOCYTES # BLD AUTO: 0.74 10*3/MM3 (ref 0.1–0.9)
MONOCYTES NFR BLD AUTO: 8.5 % (ref 5–12)
NEUTROPHILS NFR BLD AUTO: 4.82 10*3/MM3 (ref 1.7–7)
NEUTROPHILS NFR BLD AUTO: 55.5 % (ref 42.7–76)
NRBC BLD AUTO-RTO: 0 /100 WBC (ref 0–0.2)
PLATELET # BLD AUTO: 302 10*3/MM3 (ref 140–450)
PMV BLD AUTO: 11.5 FL (ref 6–12)
POTASSIUM SERPL-SCNC: 4 MMOL/L (ref 3.5–5.2)
PROT SERPL-MCNC: 7.6 G/DL (ref 6–8.5)
RBC # BLD AUTO: 5.05 10*6/MM3 (ref 4.14–5.8)
SODIUM SERPL-SCNC: 141 MMOL/L (ref 136–145)
T4 FREE SERPL-MCNC: 1.17 NG/DL (ref 0.92–1.68)
TRIGL SERPL-MCNC: 126 MG/DL (ref 0–150)
TSH SERPL DL<=0.05 MIU/L-ACNC: 0.76 UIU/ML (ref 0.27–4.2)
VLDLC SERPL-MCNC: 23 MG/DL (ref 5–40)
WBC NRBC COR # BLD AUTO: 8.69 10*3/MM3 (ref 3.4–10.8)

## 2025-08-25 ENCOUNTER — RESULTS FOLLOW-UP (OUTPATIENT)
Dept: FAMILY MEDICINE CLINIC | Facility: CLINIC | Age: 25
End: 2025-08-25
Payer: COMMERCIAL